# Patient Record
Sex: MALE | Race: WHITE | NOT HISPANIC OR LATINO | Employment: FULL TIME | URBAN - METROPOLITAN AREA
[De-identification: names, ages, dates, MRNs, and addresses within clinical notes are randomized per-mention and may not be internally consistent; named-entity substitution may affect disease eponyms.]

---

## 2020-06-04 ENCOUNTER — TRANSCRIBE ORDERS (OUTPATIENT)
Dept: ADMINISTRATIVE | Facility: HOSPITAL | Age: 50
End: 2020-06-04

## 2020-06-04 DIAGNOSIS — S46.312A: Primary | ICD-10-CM

## 2020-06-08 ENCOUNTER — APPOINTMENT (OUTPATIENT)
Dept: MRI IMAGING | Facility: HOSPITAL | Age: 50
End: 2020-06-08

## 2020-06-11 ENCOUNTER — PREP FOR SURGERY (OUTPATIENT)
Dept: OTHER | Facility: HOSPITAL | Age: 50
End: 2020-06-11

## 2020-06-11 ENCOUNTER — OFFICE VISIT (OUTPATIENT)
Dept: ORTHOPEDIC SURGERY | Facility: CLINIC | Age: 50
End: 2020-06-11

## 2020-06-11 VITALS — WEIGHT: 298 LBS | HEIGHT: 70 IN | HEART RATE: 97 BPM | OXYGEN SATURATION: 98 % | BODY MASS INDEX: 42.66 KG/M2

## 2020-06-11 DIAGNOSIS — S46.312A RUPTURE OF LEFT TRICEPS TENDON, INITIAL ENCOUNTER: Primary | ICD-10-CM

## 2020-06-11 PROCEDURE — 99204 OFFICE O/P NEW MOD 45 MIN: CPT | Performed by: ORTHOPAEDIC SURGERY

## 2020-06-11 RX ORDER — CEFAZOLIN SODIUM IN 0.9 % NACL 3 G/100 ML
3 INTRAVENOUS SOLUTION, PIGGYBACK (ML) INTRAVENOUS ONCE
Status: CANCELLED | OUTPATIENT
Start: 2020-06-11 | End: 2020-06-11

## 2020-06-11 RX ORDER — BUPROPION HYDROCHLORIDE 200 MG/1
200 TABLET, EXTENDED RELEASE ORAL 2 TIMES DAILY
COMMUNITY
Start: 2020-03-23

## 2020-06-11 RX ORDER — ACETAMINOPHEN 500 MG
1000 TABLET ORAL ONCE
Status: CANCELLED | OUTPATIENT
Start: 2020-06-11 | End: 2020-06-11

## 2020-06-11 RX ORDER — SPIRONOLACTONE 50 MG/1
TABLET, FILM COATED ORAL
COMMUNITY
Start: 2020-03-20

## 2020-06-11 RX ORDER — IBUPROFEN 800 MG/1
800 TABLET ORAL EVERY 6 HOURS PRN
COMMUNITY

## 2020-06-11 RX ORDER — LISINOPRIL AND HYDROCHLOROTHIAZIDE 25; 20 MG/1; MG/1
1 TABLET ORAL DAILY
COMMUNITY
Start: 2020-03-20

## 2020-06-11 NOTE — PROGRESS NOTES
[facility]        Subjective     Pain of the Left Elbow      HPI    Rahat Cr is a 49 y.o. male who presents with left elbow pain.  Onset: pulling injury. The issue has been ongoing for 1 week(s). Pain is a 7/10 on the pain scale. Pain is described as burning and stabbing. Associated symptoms include pain and swelling. The pain is worse with sleeping and working; pain medication and/or NSAID improve the pain. Previous treatments have included: sling and NSAIDS.    I have reviewed the following portions of the patient's history:History of Present Illness      Patient is here today for an injury to his left elbow.  He is right-hand dominant and was pulling himself up into a truck and felt a sudden give to his left elbow.  He has had a quad tendon rupture on the left side psoas milliard with that sensation.  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  He formerly worked at [employer].  He put himself in a sling.  An MRI has been done.  He is here for further evaluation and treatment.    Past Medical History:   Diagnosis Date   • ADHD    • Anxiety    • Hypertension       Past Surgical History:   Procedure Laterality Date   • HAND SURGERY Left     left index tendon x 2   • QUADRICEPS REPAIR Left       Family History   Problem Relation Age of Onset   • Hypertension Mother    • Heart disease Mother    • Hypertension Father    • Cancer Father    • Diabetes Father      Social History     Socioeconomic History   • Marital status:      Spouse name: Not on file   • Number of children: Not on file   • Years of education: Not on file   • Highest education level: Not on file   Tobacco Use   • Smoking status: Former Smoker     Packs/day: 1.50     Years: 20.00     Pack years: 30.00     Types: Cigarettes     Last attempt to quit: 2000     Years since quittin.4   • Smokeless tobacco: Never Used   Substance and Sexual Activity   • Alcohol use: Yes      "Comment: 3-4 times weekly   • Drug use: Never   • Sexual activity: Defer      Current Outpatient Medications on File Prior to Visit   Medication Sig Dispense Refill   • buPROPion SR (WELLBUTRIN SR) 200 MG 12 hr tablet Take 200 mg by mouth 2 (Two) Times a Day.     • ibuprofen (ADVIL,MOTRIN) 800 MG tablet Take 800 mg by mouth Every 6 (Six) Hours As Needed for Mild Pain .     • lisinopril-hydrochlorothiazide (PRINZIDE,ZESTORETIC) 20-25 MG per tablet Take 1 tablet by mouth Daily.     • spironolactone (ALDACTONE) 50 MG tablet TK 1 AND 1/2 TS PO QAM       No current facility-administered medications on file prior to visit.       Allergies   Allergen Reactions   • Latex, Natural Rubber Anaphylaxis        I reviewed the patient's past medical history, surgical history, family history, social history, medications and allergy list.    Review of Systems   Constitutional: Negative.    HENT: Negative.    Eyes: Negative.    Respiratory: Negative.    Cardiovascular: Negative.    Gastrointestinal: Negative.    Endocrine: Negative.    Genitourinary: Negative.    Musculoskeletal: Positive for arthralgias.   Skin: Negative.    Allergic/Immunologic: Negative.    Neurological: Negative.    Hematological: Negative.    Psychiatric/Behavioral: Negative.             Objective      Physical Exam  Pulse 97   Ht 177.8 cm (70\")   Wt 135 kg (298 lb)   SpO2 98%   BMI 42.76 kg/m²     Body mass index is 42.76 kg/m².    General  Mental Status - alert  General Appearance - cooperative, well groomed, not in acute distress  Orientation - Oriented X3  Build & Nutrition - well developed and well nourished  Posture - normal posture  Gait - normal gait     Integumentary  Global Assessment  Examination of related systems reveals - no lymphadenopathy  Ears:  No abnormality  Nose:  No mucous drainage  General Characteristics  Overall examination of the patient's skin reveals - no rashes, no evidence of scars, no suspicious lesions and no bruises.  Color - " normal coloration of skin.  Vascular: Brisk capillary refill in all extremities    Ortho Exam  Left elbow: Patient has ecchymosis medially about the mid epicondyle.  He is tender about the olecranon.  Patient's biceps tendon appears intact and he has a negative hook test.  He has 4-5 triceps strength and a palpable defect.    Imaging/Studies  Imaging Results (Last 24 Hours)     ** No results found for the last 24 hours. **      We reviewed images and a report of an MRI of the patient's left elbow from [facility] from 6/5/2020.  Patient has a full-thickness injury to the left distal triceps insertion.      Assessment    Assessment:  1. Rupture of left triceps tendon, initial encounter        Plan:  1. Continue over-the-counter medication as needed for discomfort  2. 49-year-old right-hand-dominant male with an acute injury to the left distal triceps tendon on 6/3/2020--we discussed the risk, benefits, and potential hazards of his triceps tendon tear.  Given his activity levels, I recommended reinsertion.  The risk, benefits, and potential hazards of reinsertion of the left distal triceps tendon were discussed with him at length this afternoon.  He had the opportunity ask questions and agrees to proceed with scheduling.  We will try to get this done as an outpatient this coming Wednesday and hopefully that will give him enough time to get his COVID testing done.        Flip Andrade MD  06/11/20  12:40

## 2020-06-11 NOTE — H&P (VIEW-ONLY)
[facility] [place] [facility]        Subjective     Pain of the Left Elbow      HPI    Rahat Cr is a 49 y.o. male who presents with left elbow pain.  Onset: pulling injury. The issue has been ongoing for 1 week(s). Pain is a 7/10 on the pain scale. Pain is described as burning and stabbing. Associated symptoms include pain and swelling. The pain is worse with sleeping and working; pain medication and/or NSAID improve the pain. Previous treatments have included: sling and NSAIDS.    I have reviewed the following portions of the patient's history:History of Present Illness      Patient is here today for an injury to his left elbow.  He is right-hand dominant and was pulling himself up into a truck and felt a sudden give to his left elbow.  He has had a quad tendon rupture on the left side psoas milliard with that sensation.  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  He formerly worked at [employer].  He put himself in a sling.  An MRI has been done.  He is here for further evaluation and treatment.    Past Medical History:   Diagnosis Date   • ADHD    • Anxiety    • Hypertension       Past Surgical History:   Procedure Laterality Date   • HAND SURGERY Left     left index tendon x 2   • QUADRICEPS REPAIR Left       Family History   Problem Relation Age of Onset   • Hypertension Mother    • Heart disease Mother    • Hypertension Father    • Cancer Father    • Diabetes Father      Social History     Socioeconomic History   • Marital status:      Spouse name: Not on file   • Number of children: Not on file   • Years of education: Not on file   • Highest education level: Not on file   Tobacco Use   • Smoking status: Former Smoker     Packs/day: 1.50     Years: 20.00     Pack years: 30.00     Types: Cigarettes     Last attempt to quit: 2000     Years since quittin.4   • Smokeless tobacco: Never Used   Substance and Sexual Activity   • Alcohol use: Yes      "Comment: 3-4 times weekly   • Drug use: Never   • Sexual activity: Defer      Current Outpatient Medications on File Prior to Visit   Medication Sig Dispense Refill   • buPROPion SR (WELLBUTRIN SR) 200 MG 12 hr tablet Take 200 mg by mouth 2 (Two) Times a Day.     • ibuprofen (ADVIL,MOTRIN) 800 MG tablet Take 800 mg by mouth Every 6 (Six) Hours As Needed for Mild Pain .     • lisinopril-hydrochlorothiazide (PRINZIDE,ZESTORETIC) 20-25 MG per tablet Take 1 tablet by mouth Daily.     • spironolactone (ALDACTONE) 50 MG tablet TK 1 AND 1/2 TS PO QAM       No current facility-administered medications on file prior to visit.       Allergies   Allergen Reactions   • Latex, Natural Rubber Anaphylaxis        I reviewed the patient's past medical history, surgical history, family history, social history, medications and allergy list.    Review of Systems   Constitutional: Negative.    HENT: Negative.    Eyes: Negative.    Respiratory: Negative.    Cardiovascular: Negative.    Gastrointestinal: Negative.    Endocrine: Negative.    Genitourinary: Negative.    Musculoskeletal: Positive for arthralgias.   Skin: Negative.    Allergic/Immunologic: Negative.    Neurological: Negative.    Hematological: Negative.    Psychiatric/Behavioral: Negative.             Objective      Physical Exam  Pulse 97   Ht 177.8 cm (70\")   Wt 135 kg (298 lb)   SpO2 98%   BMI 42.76 kg/m²     Body mass index is 42.76 kg/m².    General  Mental Status - alert  General Appearance - cooperative, well groomed, not in acute distress  Orientation - Oriented X3  Build & Nutrition - well developed and well nourished  Posture - normal posture  Gait - normal gait     Integumentary  Global Assessment  Examination of related systems reveals - no lymphadenopathy  Ears:  No abnormality  Nose:  No mucous drainage  General Characteristics  Overall examination of the patient's skin reveals - no rashes, no evidence of scars, no suspicious lesions and no bruises.  Color - " normal coloration of skin.  Vascular: Brisk capillary refill in all extremities    Ortho Exam  Left elbow: Patient has ecchymosis medially about the mid epicondyle.  He is tender about the olecranon.  Patient's biceps tendon appears intact and he has a negative hook test.  He has 4-5 triceps strength and a palpable defect.    Imaging/Studies  Imaging Results (Last 24 Hours)     ** No results found for the last 24 hours. **      We reviewed images and a report of an MRI of the patient's left elbow from [facility] from 6/5/2020.  Patient has a full-thickness injury to the left distal triceps insertion.      Assessment    Assessment:  1. Rupture of left triceps tendon, initial encounter        Plan:  1. Continue over-the-counter medication as needed for discomfort  2. 49-year-old right-hand-dominant male with an acute injury to the left distal triceps tendon on 6/3/2020--we discussed the risk, benefits, and potential hazards of his triceps tendon tear.  Given his activity levels, I recommended reinsertion.  The risk, benefits, and potential hazards of reinsertion of the left distal triceps tendon were discussed with him at length this afternoon.  He had the opportunity ask questions and agrees to proceed with scheduling.  We will try to get this done as an outpatient this coming Wednesday and hopefully that will give him enough time to get his COVID testing done.        Flip Andrade MD  06/11/20  12:40

## 2020-06-15 ENCOUNTER — APPOINTMENT (OUTPATIENT)
Dept: PREADMISSION TESTING | Facility: HOSPITAL | Age: 50
End: 2020-06-15

## 2020-06-15 PROCEDURE — U0004 COV-19 TEST NON-CDC HGH THRU: HCPCS

## 2020-06-15 PROCEDURE — U0002 COVID-19 LAB TEST NON-CDC: HCPCS

## 2020-06-15 PROCEDURE — C9803 HOPD COVID-19 SPEC COLLECT: HCPCS

## 2020-06-16 ENCOUNTER — ANESTHESIA EVENT (OUTPATIENT)
Dept: PERIOP | Facility: HOSPITAL | Age: 50
End: 2020-06-16

## 2020-06-16 LAB
REF LAB TEST METHOD: NORMAL
SARS-COV-2 RNA RESP QL NAA+PROBE: NOT DETECTED

## 2020-06-17 ENCOUNTER — HOSPITAL ENCOUNTER (OUTPATIENT)
Facility: HOSPITAL | Age: 50
Discharge: HOME | End: 2020-06-17
Attending: ORTHOPAEDIC SURGERY | Admitting: ORTHOPAEDIC SURGERY

## 2020-06-17 ENCOUNTER — ANESTHESIA (OUTPATIENT)
Dept: PERIOP | Facility: HOSPITAL | Age: 50
End: 2020-06-17

## 2020-06-17 VITALS
HEART RATE: 81 BPM | RESPIRATION RATE: 16 BRPM | OXYGEN SATURATION: 91 % | SYSTOLIC BLOOD PRESSURE: 113 MMHG | BODY MASS INDEX: 42.66 KG/M2 | DIASTOLIC BLOOD PRESSURE: 79 MMHG | TEMPERATURE: 97.1 F | HEIGHT: 70 IN | WEIGHT: 298 LBS

## 2020-06-17 DIAGNOSIS — S46.312A RUPTURE OF LEFT TRICEPS TENDON, INITIAL ENCOUNTER: ICD-10-CM

## 2020-06-17 LAB
GLUCOSE BLDC GLUCOMTR-MCNC: 118 MG/DL (ref 70–130)
POTASSIUM BLD-SCNC: 3.9 MMOL/L (ref 3.5–5.2)

## 2020-06-17 PROCEDURE — 25010000002 DEXAMETHASONE PER 1 MG: Performed by: NURSE ANESTHETIST, CERTIFIED REGISTERED

## 2020-06-17 PROCEDURE — 24342 REPAIR OF RUPTURED TENDON: CPT | Performed by: ORTHOPAEDIC SURGERY

## 2020-06-17 PROCEDURE — 25010000002 ONDANSETRON PER 1 MG: Performed by: NURSE ANESTHETIST, CERTIFIED REGISTERED

## 2020-06-17 PROCEDURE — 25010000002 PHENYLEPHRINE PER 1 ML: Performed by: NURSE ANESTHETIST, CERTIFIED REGISTERED

## 2020-06-17 PROCEDURE — 93010 ELECTROCARDIOGRAM REPORT: CPT | Performed by: INTERNAL MEDICINE

## 2020-06-17 PROCEDURE — 93005 ELECTROCARDIOGRAM TRACING: CPT | Performed by: ANESTHESIOLOGY

## 2020-06-17 PROCEDURE — 84132 ASSAY OF SERUM POTASSIUM: CPT | Performed by: ANESTHESIOLOGY

## 2020-06-17 PROCEDURE — 82962 GLUCOSE BLOOD TEST: CPT

## 2020-06-17 PROCEDURE — 25010000002 NEOSTIGMINE 10 MG/10ML SOLUTION: Performed by: NURSE ANESTHETIST, CERTIFIED REGISTERED

## 2020-06-17 PROCEDURE — 25010000003 LIDOCAINE 1 % SOLUTION: Performed by: NURSE ANESTHETIST, CERTIFIED REGISTERED

## 2020-06-17 PROCEDURE — 25010000002 FENTANYL CITRATE (PF) 100 MCG/2ML SOLUTION: Performed by: NURSE ANESTHETIST, CERTIFIED REGISTERED

## 2020-06-17 PROCEDURE — 25010000002 PROPOFOL 10 MG/ML EMULSION: Performed by: NURSE ANESTHETIST, CERTIFIED REGISTERED

## 2020-06-17 PROCEDURE — 24342 REPAIR OF RUPTURED TENDON: CPT | Performed by: PHYSICIAN ASSISTANT

## 2020-06-17 DEVICE — SUT FW #2 W/TPR NDL 1/2 CIR 38IN 97CM 26.5MM BLU: Type: IMPLANTABLE DEVICE | Site: ELBOW | Status: FUNCTIONAL

## 2020-06-17 RX ORDER — LIDOCAINE HYDROCHLORIDE 10 MG/ML
INJECTION, SOLUTION INFILTRATION; PERINEURAL AS NEEDED
Status: DISCONTINUED | OUTPATIENT
Start: 2020-06-17 | End: 2020-06-17 | Stop reason: SURG

## 2020-06-17 RX ORDER — FENTANYL CITRATE 50 UG/ML
50 INJECTION, SOLUTION INTRAMUSCULAR; INTRAVENOUS
Status: DISCONTINUED | OUTPATIENT
Start: 2020-06-17 | End: 2020-06-17 | Stop reason: HOSPADM

## 2020-06-17 RX ORDER — DEXAMETHASONE SODIUM PHOSPHATE 4 MG/ML
INJECTION, SOLUTION INTRA-ARTICULAR; INTRALESIONAL; INTRAMUSCULAR; INTRAVENOUS; SOFT TISSUE AS NEEDED
Status: DISCONTINUED | OUTPATIENT
Start: 2020-06-17 | End: 2020-06-17 | Stop reason: SURG

## 2020-06-17 RX ORDER — SODIUM CHLORIDE 0.9 % (FLUSH) 0.9 %
10 SYRINGE (ML) INJECTION EVERY 12 HOURS SCHEDULED
Status: DISCONTINUED | OUTPATIENT
Start: 2020-06-17 | End: 2020-06-17 | Stop reason: HOSPADM

## 2020-06-17 RX ORDER — GLYCOPYRROLATE 0.2 MG/ML
INJECTION INTRAMUSCULAR; INTRAVENOUS AS NEEDED
Status: DISCONTINUED | OUTPATIENT
Start: 2020-06-17 | End: 2020-06-17 | Stop reason: SURG

## 2020-06-17 RX ORDER — ROCURONIUM BROMIDE 10 MG/ML
INJECTION, SOLUTION INTRAVENOUS AS NEEDED
Status: DISCONTINUED | OUTPATIENT
Start: 2020-06-17 | End: 2020-06-17 | Stop reason: SURG

## 2020-06-17 RX ORDER — OXYCODONE AND ACETAMINOPHEN 7.5; 325 MG/1; MG/1
1 TABLET ORAL ONCE AS NEEDED
Status: COMPLETED | OUTPATIENT
Start: 2020-06-17 | End: 2020-06-17

## 2020-06-17 RX ORDER — LIDOCAINE HYDROCHLORIDE 10 MG/ML
0.5 INJECTION, SOLUTION EPIDURAL; INFILTRATION; INTRACAUDAL; PERINEURAL ONCE AS NEEDED
Status: COMPLETED | OUTPATIENT
Start: 2020-06-17 | End: 2020-06-17

## 2020-06-17 RX ORDER — ONDANSETRON 2 MG/ML
INJECTION INTRAMUSCULAR; INTRAVENOUS AS NEEDED
Status: DISCONTINUED | OUTPATIENT
Start: 2020-06-17 | End: 2020-06-17 | Stop reason: SURG

## 2020-06-17 RX ORDER — ACETAMINOPHEN 500 MG
1000 TABLET ORAL ONCE
Status: COMPLETED | OUTPATIENT
Start: 2020-06-17 | End: 2020-06-17

## 2020-06-17 RX ORDER — NEOSTIGMINE METHYLSULFATE 1 MG/ML
INJECTION, SOLUTION INTRAVENOUS AS NEEDED
Status: DISCONTINUED | OUTPATIENT
Start: 2020-06-17 | End: 2020-06-17 | Stop reason: SURG

## 2020-06-17 RX ORDER — PROPOFOL 10 MG/ML
VIAL (ML) INTRAVENOUS AS NEEDED
Status: DISCONTINUED | OUTPATIENT
Start: 2020-06-17 | End: 2020-06-17 | Stop reason: SURG

## 2020-06-17 RX ORDER — SODIUM CHLORIDE, SODIUM LACTATE, POTASSIUM CHLORIDE, CALCIUM CHLORIDE 600; 310; 30; 20 MG/100ML; MG/100ML; MG/100ML; MG/100ML
9 INJECTION, SOLUTION INTRAVENOUS CONTINUOUS PRN
Status: DISCONTINUED | OUTPATIENT
Start: 2020-06-17 | End: 2020-06-17 | Stop reason: HOSPADM

## 2020-06-17 RX ORDER — BUPIVACAINE HYDROCHLORIDE 2.5 MG/ML
INJECTION, SOLUTION EPIDURAL; INFILTRATION; INTRACAUDAL
Status: COMPLETED | OUTPATIENT
Start: 2020-06-17 | End: 2020-06-17

## 2020-06-17 RX ORDER — PROPOFOL 10 MG/ML
VIAL (ML) INTRAVENOUS CONTINUOUS PRN
Status: DISCONTINUED | OUTPATIENT
Start: 2020-06-17 | End: 2020-06-17 | Stop reason: SURG

## 2020-06-17 RX ORDER — SODIUM CHLORIDE, SODIUM LACTATE, POTASSIUM CHLORIDE, CALCIUM CHLORIDE 600; 310; 30; 20 MG/100ML; MG/100ML; MG/100ML; MG/100ML
INJECTION, SOLUTION INTRAVENOUS CONTINUOUS PRN
Status: DISCONTINUED | OUTPATIENT
Start: 2020-06-17 | End: 2020-06-17 | Stop reason: SURG

## 2020-06-17 RX ORDER — FAMOTIDINE 20 MG/1
20 TABLET, FILM COATED ORAL
Status: DISCONTINUED | OUTPATIENT
Start: 2020-06-17 | End: 2020-06-17 | Stop reason: HOSPADM

## 2020-06-17 RX ORDER — OXYCODONE AND ACETAMINOPHEN 7.5; 325 MG/1; MG/1
1-2 TABLET ORAL EVERY 4 HOURS PRN
Qty: 30 TABLET | Refills: 0 | Status: SHIPPED | OUTPATIENT
Start: 2020-06-17 | End: 2020-07-02

## 2020-06-17 RX ORDER — MAGNESIUM HYDROXIDE 1200 MG/15ML
LIQUID ORAL AS NEEDED
Status: DISCONTINUED | OUTPATIENT
Start: 2020-06-17 | End: 2020-06-17 | Stop reason: HOSPADM

## 2020-06-17 RX ORDER — CEFAZOLIN SODIUM IN 0.9 % NACL 3 G/100 ML
3 INTRAVENOUS SOLUTION, PIGGYBACK (ML) INTRAVENOUS ONCE
Status: COMPLETED | OUTPATIENT
Start: 2020-06-17 | End: 2020-06-17

## 2020-06-17 RX ORDER — SODIUM CHLORIDE 0.9 % (FLUSH) 0.9 %
10 SYRINGE (ML) INJECTION AS NEEDED
Status: DISCONTINUED | OUTPATIENT
Start: 2020-06-17 | End: 2020-06-17 | Stop reason: HOSPADM

## 2020-06-17 RX ORDER — ONDANSETRON 2 MG/ML
4 INJECTION INTRAMUSCULAR; INTRAVENOUS ONCE AS NEEDED
Status: DISCONTINUED | OUTPATIENT
Start: 2020-06-17 | End: 2020-06-17 | Stop reason: HOSPADM

## 2020-06-17 RX ADMIN — ACETAMINOPHEN 1000 MG: 500 TABLET ORAL at 08:27

## 2020-06-17 RX ADMIN — EPHEDRINE SULFATE 15 MG: 50 INJECTION INTRAMUSCULAR; INTRAVENOUS; SUBCUTANEOUS at 11:44

## 2020-06-17 RX ADMIN — PHENYLEPHRINE HYDROCHLORIDE 100 MCG: 10 INJECTION INTRAVENOUS at 12:11

## 2020-06-17 RX ADMIN — SODIUM CHLORIDE, POTASSIUM CHLORIDE, SODIUM LACTATE AND CALCIUM CHLORIDE: 600; 310; 30; 20 INJECTION, SOLUTION INTRAVENOUS at 13:00

## 2020-06-17 RX ADMIN — DEXAMETHASONE SODIUM PHOSPHATE 8 MG: 4 INJECTION, SOLUTION INTRAMUSCULAR; INTRAVENOUS at 11:19

## 2020-06-17 RX ADMIN — EPHEDRINE SULFATE 20 MG: 50 INJECTION INTRAMUSCULAR; INTRAVENOUS; SUBCUTANEOUS at 12:19

## 2020-06-17 RX ADMIN — SODIUM CHLORIDE, POTASSIUM CHLORIDE, SODIUM LACTATE AND CALCIUM CHLORIDE: 600; 310; 30; 20 INJECTION, SOLUTION INTRAVENOUS at 11:01

## 2020-06-17 RX ADMIN — FAMOTIDINE 20 MG: 20 TABLET, FILM COATED ORAL at 08:27

## 2020-06-17 RX ADMIN — BUPIVACAINE HYDROCHLORIDE 30 ML: 2.5 INJECTION, SOLUTION EPIDURAL; INFILTRATION; INTRACAUDAL; PERINEURAL at 09:10

## 2020-06-17 RX ADMIN — Medication 3 G: at 11:04

## 2020-06-17 RX ADMIN — GLYCOPYRROLATE 0.6 MG: 0.2 INJECTION INTRAMUSCULAR; INTRAVENOUS at 12:56

## 2020-06-17 RX ADMIN — EPHEDRINE SULFATE 15 MG: 50 INJECTION INTRAMUSCULAR; INTRAVENOUS; SUBCUTANEOUS at 12:05

## 2020-06-17 RX ADMIN — ROCURONIUM BROMIDE 50 MG: 10 INJECTION INTRAVENOUS at 11:06

## 2020-06-17 RX ADMIN — PROPOFOL 150 MG: 10 INJECTION, EMULSION INTRAVENOUS at 11:06

## 2020-06-17 RX ADMIN — FENTANYL CITRATE 50 MCG: 50 INJECTION INTRAMUSCULAR; INTRAVENOUS at 13:35

## 2020-06-17 RX ADMIN — OXYCODONE HYDROCHLORIDE AND ACETAMINOPHEN 1 TABLET: 7.5; 325 TABLET ORAL at 13:59

## 2020-06-17 RX ADMIN — LIDOCAINE HYDROCHLORIDE 50 MG: 10 INJECTION, SOLUTION INFILTRATION; PERINEURAL at 11:06

## 2020-06-17 RX ADMIN — ROCURONIUM BROMIDE 20 MG: 10 INJECTION INTRAVENOUS at 11:48

## 2020-06-17 RX ADMIN — ONDANSETRON 4 MG: 2 INJECTION INTRAMUSCULAR; INTRAVENOUS at 12:52

## 2020-06-17 RX ADMIN — NEOSTIGMINE 5 MG: 1 INJECTION INTRAVENOUS at 12:56

## 2020-06-17 RX ADMIN — FENTANYL CITRATE 50 MCG: 50 INJECTION INTRAMUSCULAR; INTRAVENOUS at 13:57

## 2020-06-17 RX ADMIN — SODIUM CHLORIDE, POTASSIUM CHLORIDE, SODIUM LACTATE AND CALCIUM CHLORIDE 9 ML/HR: 600; 310; 30; 20 INJECTION, SOLUTION INTRAVENOUS at 08:27

## 2020-06-17 RX ADMIN — PROPOFOL 25 MCG/KG/MIN: 10 INJECTION, EMULSION INTRAVENOUS at 11:18

## 2020-06-17 RX ADMIN — LIDOCAINE HYDROCHLORIDE 0.2 ML: 10 INJECTION, SOLUTION EPIDURAL; INFILTRATION; INTRACAUDAL; PERINEURAL at 08:27

## 2020-06-17 NOTE — ANESTHESIA PREPROCEDURE EVALUATION
Anesthesia Evaluation     Patient summary reviewed and Nursing notes reviewed   no history of anesthetic complications:  NPO Solid Status: > 8 hours  NPO Liquid Status: > 2 hours           Airway   Mallampati: II  TM distance: >3 FB  Neck ROM: full  No difficulty expected  Dental - normal exam     Pulmonary - normal exam    breath sounds clear to auscultation  (+) sleep apnea on CPAP,   Cardiovascular - normal exam    ECG reviewed  Rhythm: regular  Rate: normal    (+) hypertension,       Neuro/Psych- negative ROS  GI/Hepatic/Renal/Endo    (+) morbid obesity,      Musculoskeletal         ROS comment: Left distal triceps tendon rupture  Abdominal    Substance History - negative use     OB/GYN          Other - negative ROS                       Anesthesia Plan    ASA 3     general with block   (Left infraclavicular catheter with arrow pump for post-operative analgesia per request of Dr. Perez)  intravenous induction     Anesthetic plan, all risks, benefits, and alternatives have been provided, discussed and informed consent has been obtained with: patient.    Plan discussed with CRNA.

## 2020-06-17 NOTE — ANESTHESIA PROCEDURE NOTES
Peripheral Block      Patient reassessed immediately prior to procedure    Patient location during procedure: pre-op  Start time: 6/17/2020 8:42 AM  Stop time: 6/17/2020 9:18 AM  Reason for block: at surgeon's request and post-op pain management  Performed by  Anesthesiologist: Tu Soriano MD  Assisted by: Emily Dukes RN  Preanesthetic Checklist  Completed: patient identified, site marked, surgical consent, pre-op evaluation, timeout performed, IV checked and monitors and equipment checked  Prep:  Pt Position: supine  Sterile barriers:cap, gloves, mask and sterile barriers  Prep: ChloraPrep  Patient monitoring: blood pressure monitoring, continuous pulse oximetry and EKG  Procedure  Sedation:yes    Guidance:ultrasound guided  Images:still images obtained    Laterality:left  Block Type:supraclavicular  Injection Technique:single-shot  Needle Type:echogenic and short-bevel  Needle Gauge:20 G  Resistance on Injection: none    Medications Used: bupivacaine PF (MARCAINE) 0.25 % injection, 30 mL  Med admintered at 6/17/2020 9:10 AM      Post Assessment  Injection Assessment: negative aspiration for heme, no paresthesia on injection and incremental injection  Patient Tolerance:comfortable throughout block  Complications:no  Additional Notes  Procedure:                 The pt was placed in semifowlers position with a slight tilt of the thorax contralateral to the insertion site.  The Insertion Site was prepped and draped in sterile fashion.  The pt was anesthetized with  IV Sedation( see meds) and  skin and cutaneous tissue where infiltrated and anesthetized with 1% Lidocaine 3 mls via a 25g needle.  Utilizing ultrasound guidance, a BBraun  Contiplex needle was advanced in-plane in a lateral to medial direction.  Hydro dissection of tissue was achieved with Normal saline. Major vessels(supraclavicular artery) and the pleura where visualized as the brachial plexus was approached at the level immediately adjacent  and superior to the clavicle. LA spread was visualized and injection was made incrementally every 5 mls with aspiration. Injection pressure was normal or little, there was no intraneural injection, no vascular injection. Thank You.

## 2020-06-17 NOTE — OP NOTE
Orthopaedics Operative Report    PREOPERATIVE DIAGNOSIS: Left triceps tendon rupture    POSTOPERATIVE DIAGNOSIS: Same    PROCEDURE PERFORMED: Reinsertion left triceps tendon    CPT:96725    SURGEON: Flip Andrade MD    ANESTHESIA:  General with Block    STAFF:  Circulator: Ruthie Garcia RN; Elen Ascencio RN; Subha Lau RN  Scrub Person: Jazmyn Bailon; Rahul Jessica  Nursing Assistant: Becki Pedraza PCT  Assistant: Joceline Martin PA-C  Orientee: Magalys Arnold    TOURNIQUET TIME: 85 minutes    ESTIMATED BLOOD LOSS: Minimal    COMPLICATIONS: None apparent.  Broken drill bit fragment retained in ulna    IMPLANTS: None    PREOPERATIVE ANTIBIOTICS: Kefzol 3 g    REFERRING PHYSICIAN: Atul Villegas MD    INDICATIONS: Complete rupture left triceps insertion    DESCRIPTION OF PROCEDURE: After informed consent was obtained, the patient was taken to the operating room.  After the smooth induction of general and regional anesthesia, the patient was gently positioned in the lateral decubitus position taking care to pad all bony prominences.  Axillary roll was placed.  We then sterilely prepped and draped the left upper extremity in the usual fashion for this type of procedure using a nonsterile tourniquet.  After timeout to verify the site and the procedure to be performed, we exsanguinated the left upper extremity using an Esmarch bandage and inflated tourniquet to 250 mmHg.  We made a curvilinear incision over the posterior aspect of the elbow.  Dissected sharply and bluntly and created full-thickness flaps.  We dissected the olecranon bursa off the tip of the olecranon.  We identified our peritenon and this was saved for later repair.  Once the triceps tendon rupture was identified the ends were freshened.  We dissected the long lateral heads free from the adjacent tissues.  Once this was performed, we then placed 3 drill holes in the proximal ulna.  Initially, we had used a 2 oh drill bit  and unfortunately this drill bit broke within the proximal ulna.  We spent some time trying to get this out but felt that we were going to do more harm than good by potentially weakening the proximal ulna.  Again a 2.4 drill bit was then used to complete 3 drill holes and then 2 loops of FiberWire were placed through these drill holes in retrograde fashion placing the loops on the proximal ulna.  Keeping the needle intact from the FiberWire, these were looped through the tendon in a modified [place] stitch.  A free needle was then used to bring the free tail of suture that was had not been placed in a [place] back through the tendon to facilitate knot tying on the top of the triceps tendon.  This was done with the elbow in full extension we had excellent convergence of the tendon onto the olecranon which had been freshened to a bony bleeding bed.  The repair was stable to 45 degrees of flexion without obvious gapping noted.  We then closed the retinacular layers using FiberWire.  The peritenon was repaired using running 2-0 Vicryl.  Subcutaneous layer was closed using 2-0 Vicryl.  The skin was closed using staples.  Sterile dressings were applied and the patient was placed in a well-padded posterior splint in 45 degrees of flexion patient was then transferred back to his stretcher and then to the recovery room in stable condition.  All counts were correct postoperatively.  I performed the case.      Assistant: Joceline Martin    The skilled assistance of the above noted first assistant was necessary during this complex surgical procedure.  The surgical assistant assisted with every aspect of the operation including, but not limited to, proper and safe positioning of the patient, obtaining adequate surgical exposure, manipulation of surgical instruments, suture management, surgical knot tying when necessary, the continual process of hemostasis during the procedure itself in addition to surgical wound closure and  removal of the patient from the operating table and returning the patient back to the [facility].  The assistance of the surgical assistant allowed me to perform the most sensitive and technical potions of this operation using 2 hands, thus enhancing efficiency and patient safety.  This would not be possible without the help of a skilled assistant familiar with the procedure and capable of safely performing the aforementioned tasks.    POSTOPERATIVE PLAN:  1. Weight bearing status: Nonweightbearing left upper extremity  2.  Percocet for pain control  3. Follow-up in 2 weeks for wound check and staple removal  4. Keep incisions clean and dry  5.  Patient will come back in 2 weeks and be placed in a hinged elbow brace and we will keep him locked at 45 degrees for 4 weeks and then gradually begin restoring flexion.        Flip Andrade M.D.*

## 2020-06-17 NOTE — INTERVAL H&P NOTE
"Pre-Op H&P (See Recent Office Note Attached for Full H&P)    Chief complaint: Left elbow pain    HPI:      Patient is a 49 y.o. male who presents with left elbow pain related to an injury. Earlier this month, he was pulling himself up into a truck and felt a sudden give to his left elbow. He describes the pain as burning and stabbing with associated swelling. MRI of the left elbow from 6/5/20 shows a  full-thickness injury to the left distal triceps insertion. Surgical intervention is recommended and he is agreeable. He is here today for a left tricep tendon repair.      Review of Systems:  General ROS:  no fever, chills, rashes.  No change since last office visit.  No recent sick exposure  Cardiovascular ROS: no chest pain or dyspnea on exertion; +HTN  Respiratory ROS: no cough, shortness of breath, or wheezing; +PREMA (uses CAP), former cigarette smoker (1.5 ppd x 20 years)- quit 2000    Meds:    No current facility-administered medications on file prior to encounter.      Current Outpatient Medications on File Prior to Encounter   Medication Sig Dispense Refill   • buPROPion SR (WELLBUTRIN SR) 200 MG 12 hr tablet Take 200 mg by mouth 2 (Two) Times a Day.     • ibuprofen (ADVIL,MOTRIN) 800 MG tablet Take 800 mg by mouth Every 6 (Six) Hours As Needed for Mild Pain .     • lisinopril-hydrochlorothiazide (PRINZIDE,ZESTORETIC) 20-25 MG per tablet Take 1 tablet by mouth Daily.     • spironolactone (ALDACTONE) 50 MG tablet TK 1 AND 1/2 TS PO QAM         Vital Signs:  /84 (BP Location: Right arm, Patient Position: Lying)   Pulse 70   Temp 97.5 °F (36.4 °C) (Temporal)   Resp 16   Ht 177.8 cm (70\")   Wt 135 kg (298 lb)   SpO2 97%   BMI 42.76 kg/m²     Physical Exam:    CV:  S1S2 regular rate and rhythm, no murmur               Resp:  Clear to auscultation; respirations regular, even and unlabored    Results Review:      I reviewed the patient's new clinical results.     *Pre-op lab work pending    6/15/20 " COVID19: negative    Cancer Staging (if applicable)  Cancer Patient: __ yes _X_no __unknown; If yes, clinical stage T:__ N:__M:__, stage group or __N/A    Assessment: Left tricep tendon tear    Plan: Left tricep tendon repair      Adriane Bran, APRN  6/17/2020   08:22

## 2020-06-17 NOTE — ANESTHESIA PROCEDURE NOTES
Airway  Urgency: elective    Date/Time: 6/17/2020 11:07 AM  Airway not difficult    General Information and Staff    Patient location during procedure: OR  CRNA: Rosa Villalta CRNA    Indications and Patient Condition  Indications for airway management: airway protection    Preoxygenated: yes  MILS not maintained throughout  Mask difficulty assessment: 1 - vent by mask    Final Airway Details  Final airway type: endotracheal airway      Successful airway: ETT  Cuffed: yes   Successful intubation technique: direct laryngoscopy  Facilitating devices/methods: intubating stylet  Endotracheal tube insertion site: oral  Blade: Michaels  Blade size: 2  ETT size (mm): 7.0  Cormack-Lehane Classification: grade I - full view of glottis  Placement verified by: chest auscultation and capnometry   Measured from: lips  ETT/EBT  to lips (cm): 20  Number of attempts at approach: 1    Additional Comments  Negative epigastric sounds, Breath sound equal bilaterally with symmetric chest rise and fall.  Teeth intact, atraumatic

## 2020-06-17 NOTE — ANESTHESIA POSTPROCEDURE EVALUATION
Patient: Rahat Cr    Procedure Summary     Date:  06/17/20 [facility] / [facility]:   [facility]    Anesthesia Start:  1101 Anesthesia Stop:  1318    Procedure:  TRICEP TENDON REPAIR (Left Elbow) Diagnosis:       Rupture of left triceps tendon, initial encounter      (Rupture of left triceps tendon, initial encounter [S46.312A])    Surgeon:  Flip Andrade MD Provider:  Tu Soriano MD    Anesthesia Type:  general with block ASA Status:  3          Anesthesia Type: general with block    Vitals  Vitals Value Taken Time   /66 6/17/2020  1:17 PM   Temp 97.1 °F (36.2 °C) 6/17/2020  1:17 PM   Pulse 80 6/17/2020  1:17 PM   Resp 18 6/17/2020  1:17 PM   SpO2 92 % 6/17/2020  1:17 PM           Post Anesthesia Care and Evaluation    Patient location during evaluation: PACU  Patient participation: waiting for patient participation  Level of consciousness: sleepy but conscious  Pain score: 0  Pain management: adequate  Airway patency: patent  Anesthetic complications: No anesthetic complications  PONV Status: none  Cardiovascular status: hemodynamically stable and acceptable  Respiratory status: nonlabored ventilation, acceptable, nasal cannula and oral airway  Hydration status: acceptable

## 2020-06-18 ENCOUNTER — TELEPHONE (OUTPATIENT)
Dept: ORTHOPEDIC SURGERY | Facility: CLINIC | Age: 50
End: 2020-06-18

## 2020-06-18 NOTE — TELEPHONE ENCOUNTER
Would you like me to relay to him how the surgery went? Or any other information.  Thanks.  Danielle

## 2020-06-18 NOTE — TELEPHONE ENCOUNTER
Please write a note up for the patient to return to work.  Patient may return to work but with limited use of his left upper extremity.  Must wear sling and splint at all times.    Please email this work release to the patient at his email address listed below.    [email or web address]      Thanks    SERINA

## 2020-06-18 NOTE — TELEPHONE ENCOUNTER
PT STATES HE WASN'T ABLE TO TALK TO JRT AFTER HIS SURGERY YESTERDAY AND WANTS TO TALK TO JRT. SAYS HE DOESN'T KNOW HOW SURGERY WENT AND RETURN TO WORK, ETC.

## 2020-06-25 ENCOUNTER — TELEPHONE (OUTPATIENT)
Dept: ORTHOPEDIC SURGERY | Facility: CLINIC | Age: 50
End: 2020-06-25

## 2020-06-25 NOTE — TELEPHONE ENCOUNTER
PATIENT HAD SURGERY WITH DR. SANCHEZ ON 6/17/2020. HE IS EXPERIENCING ELECTRONIC PULSES THAT ARE GOING DOWN THE ARM TO HIS ELBOW AND ALL THE WAY UP TO HIS SHOULDER. HE WOULD LIKE SOMEONE TO CALL HIM BACK -[phone]

## 2020-06-25 NOTE — TELEPHONE ENCOUNTER
"I spoke with the patient and advised him per JRT, \"I agree with you.  It sounds like an irritated nerve.  Patient did have a nerve block preoperatively and it could be coming from that.  He could have an irritated neck from the time of surgery as well.  I am happy to call in some Neurontin if he would like but hopefully this will just get better with time.  I do not believe it is associated with the actual surgical procedure done since the only nerve we were near was the ulnar nerve and that would affect medial side of the arm and hand.\"    He understood and was okay with this and will call back if he has any other questions or concerns.     Ceyl   "

## 2020-06-25 NOTE — TELEPHONE ENCOUNTER
The patient mentioned that he feels like the pins and needles going from the elbow up to the shoulder to the other arm as well. He mentioned that nothing feels terribly wrong, but its just this jolt of pain that he is having. I asked if he was using his left arm on a limited basis since he is back at work, he mentioned that he does not use it hardly at all, but he can make it to where he is not using it. I asked if he has done anything different lately or if this is like this all the time. He mentioned that it has just been this way. I mentioned that it could possibly be a nerve but I would send the message to Dr. Andrade to see what his thoughts were. Please advise.    Cely

## 2020-06-25 NOTE — TELEPHONE ENCOUNTER
I agree with you.  It sounds like an irritated nerve.  Patient did have a nerve block preoperatively and it could be coming from that.  He could have an irritated neck from the time of surgery as well.  I am happy to call in some Neurontin if he would like but hopefully this will just get better with time.  I do not believe it is associated with the actual surgical procedure done since the only nerve we were near was the ulnar nerve and that would affect medial side of the arm and hand.    Thank you    SERINA

## 2020-07-02 ENCOUNTER — OFFICE VISIT (OUTPATIENT)
Dept: ORTHOPEDIC SURGERY | Facility: CLINIC | Age: 50
End: 2020-07-02

## 2020-07-02 DIAGNOSIS — S46.312D RUPTURE OF LEFT TRICEPS TENDON, SUBSEQUENT ENCOUNTER: ICD-10-CM

## 2020-07-02 DIAGNOSIS — Z98.890 STATUS POST MUSCULOSKELETAL SYSTEM SURGERY: Primary | ICD-10-CM

## 2020-07-02 PROCEDURE — 99024 POSTOP FOLLOW-UP VISIT: CPT | Performed by: PHYSICIAN ASSISTANT

## 2020-07-02 NOTE — PROGRESS NOTES
[facility] [place] [facility]opa[facility] Clinic Note        Subjective     Post-op (15 days status post TRICEP TENDON REPAIR LEFT 6/17/20)       MARY Cr is a 49 y.o. male.  Patient presents for their initial postop visit following left tricep tendon repair performed on the above date by Dr. Andrade.  Patient currently endorses a pain scale at 2/10.  He still in his postoperative splint.  He denies any numbness or tingling.    No reported fever, chills, night sweats or other constitutional symptoms.      Objective      Physical Exam  There were no vitals taken for this visit.    There is no height or weight on file to calculate BMI.        Ortho Exam    Right Upper Extremity:      Musculoskeletal     Inspection and Palpation:      Elbow:      Tenderness -mild    Swelling - minimal     ROM: 0-45 elbow.  Patient has full range of motion of the wrist and digits without restrictions or limitations.    Motor: Grossly intact radial, ulnar, median, AIN, PIN.    Sensory: Grossly intact to light touch radial, ulnar, median nerve distributions.    Vascular: 2+ radial pulse with brisk capillary refill noted and each digit.       Incision:  Healing appropriately with staples in place.  No redness, warmth, drainage or evidence of infection.      Imaging Reviewed:  No new imaging today.      Assessment:  1. Status post musculoskeletal system surgery    2. Rupture of left triceps tendon, subsequent encounter        Plan:  1. Status post repair left tricep tendon, stable.  2. Sutures were removed today.  3. Patient was placed in a hinged elbow brace that was unlocked 0 to 45 degrees.  4. He is to perform gentle range of motion in this range only no weightbearing activities.  5. Continue with over-the-counter pain medication as needed.  6. Follow-up in 2 weeks for repeat evaluation and begin gradually opening brace.  7. Questions and concerns answered.    Patient was also examined by Dr. Andrade and he agrees with the above  assessment and plan.      Joceline Martin PA-C  07/04/20  10:30

## 2020-07-16 ENCOUNTER — TELEPHONE (OUTPATIENT)
Dept: ORTHOPEDIC SURGERY | Facility: CLINIC | Age: 50
End: 2020-07-16

## 2020-07-16 ENCOUNTER — OFFICE VISIT (OUTPATIENT)
Dept: ORTHOPEDIC SURGERY | Facility: CLINIC | Age: 50
End: 2020-07-16

## 2020-07-16 DIAGNOSIS — S46.312D RUPTURE OF LEFT TRICEPS TENDON, SUBSEQUENT ENCOUNTER: ICD-10-CM

## 2020-07-16 DIAGNOSIS — Z98.890 STATUS POST MUSCULOSKELETAL SYSTEM SURGERY: Primary | ICD-10-CM

## 2020-07-16 PROCEDURE — 99024 POSTOP FOLLOW-UP VISIT: CPT | Performed by: PHYSICIAN ASSISTANT

## 2020-07-16 NOTE — PROGRESS NOTES
[facility] [place] [facility] Note        Subjective     Post-op Follow-up (2 week f/u, 1 month status post TRICEP TENDON REPAIR LEFT 6/17/20)       MARY Cr is a 49 y.o. male.  Patient returns for follow-up evaluation left elbow tricep tendon repair.  He is 4 weeks out from surgery.  He has no complaints or issues.  He denies any pain, numbness or tingling.  He is wearing the elbow brace as directed.  Currently working on range of motion 0 to approximately 50 degrees.    No reported fever, chills, night sweats or other constitutional symptoms.        Objective      Physical Exam  There were no vitals taken for this visit.    There is no height or weight on file to calculate BMI.        Ortho Exam    Right Upper Extremity:       Musculoskeletal                 Inspection and Palpation:                  Elbow:                                      Tenderness - none                          Swelling - none                                 ROM: 0-50 elbow.  Patient has full range of motion of the wrist and digits without restrictions or limitations.                          Motor: Grossly intact radial, ulnar, median, AIN, PIN.                          Sensory: Grossly intact to light touch radial, ulnar, median nerve distributions.                          Vascular: 2+ radial pulse with brisk capillary refill noted and each digit.                                        Incision:   Healing well without redness, warmth, drainage or evidence of infection.       Imaging Reviewed:  No new imaging today.      Assessment:  1. Status post musculoskeletal system surgery    2. Rupture of left triceps tendon, subsequent encounter        Plan:  1. Status post repair left tricep tendon, stable.  2. Patient's brace was opened 0-60 today.  He was instructed that every week he can increase by 10 degrees.  3. Once again gentle range of motion through the range that his brace is open but no weightbearing  activities.  4. Continue with over-the-counter pain medication as needed.  5. Follow-up in 4 weeks for repeat evaluation.  6. Questions and concerns answered.     Patient was also examined by Dr. Andrade and he agrees with the above assessment and plan.      Joceline Martin PA-C  07/16/20  13:27

## 2020-07-16 NOTE — TELEPHONE ENCOUNTER
I spoke with the patient and advised that per Joceline, he is able to have an arm compression sleeve on that arm as long as it does not restriction the motion on the arm. He understood.    Cely

## 2020-07-16 NOTE — TELEPHONE ENCOUNTER
Patient called and wants to know if he can wear a compression sleeve on the arm he had surgery on? Patient can be reached @ [phone]

## 2020-07-28 ENCOUNTER — TELEPHONE (OUTPATIENT)
Dept: ORTHOPEDIC SURGERY | Facility: CLINIC | Age: 50
End: 2020-07-28

## 2020-07-28 NOTE — TELEPHONE ENCOUNTER
PATIENT CALLED REGARDING STRENGTH TRAINING. HE ASKED WHEN HE WILL BE ABLE TO USE HIS ARM FOR STRENGTH EXERCISES. PATIENT CAN BE REACHED -[phone].

## 2020-07-28 NOTE — TELEPHONE ENCOUNTER
Dr. Andrade-please see message below and advise. Pt is about 6 weeks out from tricep tendon repair.    Thank you!  Eron

## 2020-07-28 NOTE — TELEPHONE ENCOUNTER
Typically start strengthening at about 12 to 16 weeks.  No sooner than that because it takes that long for the tendon to grow into bone.  Early therapy should be focused on range of motion of the elbow and forearm.    Thank you    SERINA

## 2020-08-13 ENCOUNTER — OFFICE VISIT (OUTPATIENT)
Dept: ORTHOPEDIC SURGERY | Facility: CLINIC | Age: 50
End: 2020-08-13

## 2020-08-13 DIAGNOSIS — S46.312D RUPTURE OF LEFT TRICEPS TENDON, SUBSEQUENT ENCOUNTER: ICD-10-CM

## 2020-08-13 DIAGNOSIS — Z98.890 STATUS POST MUSCULOSKELETAL SYSTEM SURGERY: Primary | ICD-10-CM

## 2020-08-13 PROCEDURE — 99024 POSTOP FOLLOW-UP VISIT: CPT | Performed by: ORTHOPAEDIC SURGERY

## 2020-08-13 NOTE — PROGRESS NOTES
[facility] [place] [facility] [place]        Subjective     CC: Post-op (8 weeks status post TRICEP TENDON REPAIR LEFT 6/17/20))      MARY Cr is a 49 y.o. male.  Patient is 8 weeks out from left triceps tendon repair.  Patient has been wearing his brace.  He reports no problems.  He has been compliant with not using the elbow forcefully.        ROS:    Constiutional:Pt denies fever, chills, nausea, or vomiting.  MSK:as above        Objective      Past Medical History  Past Medical History:   Diagnosis Date   • ADHD    • Anxiety    • Hypertension    • Sleep apnea     wears cpap         Physical Exam  There were no vitals taken for this visit.    There is no height or weight on file to calculate BMI.    Patient is well nourished and well developed.        Ortho Exam  Range of motion 0-130  Full forearm supination and pronation  Incision is healed and free of erythema or drainage    Imaging/Labs/EMG Reviewed:  Imaging Results (Last 24 Hours)     ** No results found for the last 24 hours. **          Assessment    Assessment:  1. Status post musculoskeletal system surgery    2. Rupture of left triceps tendon, subsequent encounter        Plan:  1. Recommend over the counter anti-inflammatories for pain and/or swelling  2. Status post left triceps tendon repair--patient can get rid of the brace when he is not at work.  I have cautioned him about pushing himself up from a chair and other forceful uses of the elbow.  I would like for him to continue using the brace at work, however, to avoid reinjury.  See him back in about 6 to 8 weeks and hopefully we can give him a full release at that point.      Flip Andrade MD  08/13/20  19:33

## 2020-09-04 ENCOUNTER — TELEPHONE (OUTPATIENT)
Dept: ORTHOPEDIC SURGERY | Facility: CLINIC | Age: 50
End: 2020-09-04

## 2020-09-04 NOTE — TELEPHONE ENCOUNTER
Called and LVM twice for him to give me a call back.  I am trying to finish [facility] paperwork.  Danielle

## 2020-09-09 NOTE — TELEPHONE ENCOUNTER
Called and LVM to let him know that I need to speak to him to finish his paperwork.  Please return my call.  Danielle

## 2020-09-22 ENCOUNTER — TELEMEDICINE (OUTPATIENT)
Dept: ORTHOPEDIC SURGERY | Facility: CLINIC | Age: 50
End: 2020-09-22

## 2020-09-22 DIAGNOSIS — Z98.890 STATUS POST MUSCULOSKELETAL SYSTEM SURGERY: Primary | ICD-10-CM

## 2020-09-22 DIAGNOSIS — S46.312D RUPTURE OF LEFT TRICEPS TENDON, SUBSEQUENT ENCOUNTER: ICD-10-CM

## 2020-09-22 PROCEDURE — 99422 OL DIG E/M SVC 11-20 MIN: CPT | Performed by: ORTHOPAEDIC SURGERY

## 2020-09-22 NOTE — PROGRESS NOTES
[facility] [place] [facility] [place]/Video Visit Note        Subjective     CC: No chief complaint on file.  Follow-up left elbow.    HPI    Rahat Cr is a 49 y.o. male.  Patient is 3-1/2 months out from repair and reinsertion of left distal triceps tendon.  He is doing well.  He continues to work without difficulty.  He is a little bit apprehensive about rerupture.    You have chosen to receive care through a telehealth visit.  Do you consent to use a video + audio connection for your medical care today? Yes     ROS:    Constiutional:Pt denies fever, chills, nausea, or vomiting.  MSK:as above        Objective      Past Medical History  Past Medical History:   Diagnosis Date   • ADHD    • Anxiety    • Hypertension    • Sleep apnea     wears cpap         Telephone/video visit Notes:  Patient is doing well overall.  Full range of motion of the elbow and forearm.  No pain.  He is back to work.  He has a lot of heavy lifting around the house but he tells me he is going to have his son help with that work.  I have asked him to be careful with regards to heavy lifting and he is promised me that he will do low weight high repetition.  Follow-up PRN        Assessment    Assessment:  1. Status post musculoskeletal system surgery    2. Rupture of left triceps tendon, subsequent encounter        Plan:  1. Recommend over the counter anti-inflammatories for pain and/or swelling  2. Status post reinsertion left triceps tendon--see plan above.  Follow-up PRN    A total of 12 minutes was spent before, during, and after the visit for patient care, counseling and care coordination.    Flip Andrade MD  09/22/20  16:45 EDT

## (undated) DEVICE — GLV SURG PREMIERPRO MIC LTX PF SZ8 BRN

## (undated) DEVICE — SUT ETHLN 3/0 PC5 18IN 1893G

## (undated) DEVICE — DRP C/ARM MINI

## (undated) DEVICE — SPNG GZ WOVN 4X4IN 12PLY 10/BX STRL

## (undated) DEVICE — PROXIMATE RH ROTATING HEAD SKIN STAPLERS (35 WIDE) CONTAINS 35 STAINLESS STEEL STAPLES: Brand: PROXIMATE

## (undated) DEVICE — PK MAJ SHLDR SPLT 10

## (undated) DEVICE — HEWSON SUTURE RETRIEVER: Brand: HEWSON SUTURE RETRIEVER

## (undated) DEVICE — SST TWIST DRILL, STANDARD, 2MM DIA. X 127MM: Brand: MICROAIRE®

## (undated) DEVICE — ANTIBACTERIAL UNDYED BRAIDED (POLYGLACTIN 910), SYNTHETIC ABSORBABLE SUTURE: Brand: COATED VICRYL

## (undated) DEVICE — GOWN,REINF,POLY,ECL,PP SLV,XL: Brand: MEDLINE

## (undated) DEVICE — BNDG ELAS ELITE V/CLOSE 6IN 5YD LF STRL

## (undated) DEVICE — SST TWIST DRILL, STANDARD, 2.4MM DIA. X 127MM: Brand: MICROAIRE®

## (undated) DEVICE — PAD UNDERCAST WYTEX 6IN 4YD LF STRL

## (undated) DEVICE — ARM SLING: Brand: DEROYAL

## (undated) DEVICE — CVR HNDL LIGHT RIGID

## (undated) DEVICE — INTENDED FOR TISSUE SEPARATION, AND OTHER PROCEDURES THAT REQUIRE A SHARP SURGICAL BLADE TO PUNCTURE OR CUT.: Brand: BARD-PARKER ® STAINLESS STEEL BLADES

## (undated) DEVICE — DRSNG GZ CURAD XEROFORM NONADHR OVERWRAP 5X9IN

## (undated) DEVICE — INTENT TO BE USED WITH SUTURE MATERIAL FOR TISSUE CLOSURE: Brand: RICHARD-ALLAN® NEEDLE 1/2 CIRCLE TAPER

## (undated) DEVICE — BNDG ELAS ELITE V/CLOSE 4IN 5YD LF STRL

## (undated) DEVICE — PAD UNDERCAST WYTEX 4IN 4YD LF STRL